# Patient Record
Sex: FEMALE | Race: WHITE | NOT HISPANIC OR LATINO
[De-identification: names, ages, dates, MRNs, and addresses within clinical notes are randomized per-mention and may not be internally consistent; named-entity substitution may affect disease eponyms.]

---

## 2023-07-12 ENCOUNTER — APPOINTMENT (OUTPATIENT)
Dept: HEART AND VASCULAR | Facility: CLINIC | Age: 53
End: 2023-07-12

## 2023-07-20 ENCOUNTER — APPOINTMENT (OUTPATIENT)
Dept: HEART AND VASCULAR | Facility: CLINIC | Age: 53
End: 2023-07-20
Payer: MEDICAID

## 2023-07-20 ENCOUNTER — NON-APPOINTMENT (OUTPATIENT)
Age: 53
End: 2023-07-20

## 2023-07-20 VITALS
HEIGHT: 64 IN | HEART RATE: 70 BPM | DIASTOLIC BLOOD PRESSURE: 90 MMHG | SYSTOLIC BLOOD PRESSURE: 140 MMHG | RESPIRATION RATE: 12 BRPM | WEIGHT: 146 LBS | BODY MASS INDEX: 24.92 KG/M2

## 2023-07-20 DIAGNOSIS — R01.1 CARDIAC MURMUR, UNSPECIFIED: ICD-10-CM

## 2023-07-20 DIAGNOSIS — Z82.49 FAMILY HISTORY OF ISCHEMIC HEART DISEASE AND OTHER DISEASES OF THE CIRCULATORY SYSTEM: ICD-10-CM

## 2023-07-20 DIAGNOSIS — Z78.9 OTHER SPECIFIED HEALTH STATUS: ICD-10-CM

## 2023-07-20 DIAGNOSIS — Z86.39 PERSONAL HISTORY OF OTHER ENDOCRINE, NUTRITIONAL AND METABOLIC DISEASE: ICD-10-CM

## 2023-07-20 PROCEDURE — 99204 OFFICE O/P NEW MOD 45 MIN: CPT | Mod: 25

## 2023-07-20 PROCEDURE — 93306 TTE W/DOPPLER COMPLETE: CPT

## 2023-07-20 PROCEDURE — 93000 ELECTROCARDIOGRAM COMPLETE: CPT

## 2023-07-20 RX ORDER — SIMVASTATIN 20 MG/1
20 TABLET, FILM COATED ORAL
Refills: 0 | Status: ACTIVE | COMMUNITY

## 2023-07-20 RX ORDER — DROSPIRENONE AND ETHINYL ESTRADIOL 0.02-3(28)
3-0.02 KIT ORAL
Refills: 0 | Status: ACTIVE | COMMUNITY

## 2023-07-20 NOTE — PHYSICAL EXAM

## 2023-07-20 NOTE — HISTORY OF PRESENT ILLNESS
[FreeTextEntry1] : 53-year-old female with a past medical history of hyperlipidemia and strong family history of early atherosclerotic heart disease comes in to establish care.  Overall, feels well denies chest pain shortness of breath PND orthopnea.

## 2023-07-20 NOTE — DISCUSSION/SUMMARY
[FreeTextEntry1] : 1.  Hypertension: Borderline; advised to keep a log of her blood pressure for the next 10 days and to return in 10 days with the machine and log for comparison to manual pressure reading.  Maintain a low-salt diet\par 2.  Hyperlipidemia: Patient to have fasting blood work done by PCP at her next visit\par 3.  Cardiac murmur: Echocardiogram [EKG obtained to assist in diagnosis and management of assessed problem(s)] : EKG obtained to assist in diagnosis and management of assessed problem(s)

## 2023-08-01 ENCOUNTER — APPOINTMENT (OUTPATIENT)
Dept: HEART AND VASCULAR | Facility: CLINIC | Age: 53
End: 2023-08-01
Payer: MEDICAID

## 2023-08-01 VITALS
HEART RATE: 90 BPM | SYSTOLIC BLOOD PRESSURE: 138 MMHG | WEIGHT: 145 LBS | HEIGHT: 64 IN | DIASTOLIC BLOOD PRESSURE: 90 MMHG | BODY MASS INDEX: 24.75 KG/M2 | RESPIRATION RATE: 12 BRPM

## 2023-08-01 DIAGNOSIS — I10 ESSENTIAL (PRIMARY) HYPERTENSION: ICD-10-CM

## 2023-08-01 PROCEDURE — 99213 OFFICE O/P EST LOW 20 MIN: CPT

## 2023-08-01 NOTE — HISTORY OF PRESENT ILLNESS
[FreeTextEntry1] : 53-year-old female comes in for hypertension follow-up.  Since last visit patient has been keeping a blood pressure log twice a day on average blood pressure between 118-130/80-90.  Overall feels well denies chest pain shortness of breath PND orthopnea.

## 2023-08-01 NOTE — DISCUSSION/SUMMARY
[FreeTextEntry1] : 1.  Hypertension: Patient's machine recorded pressure of 150/100 clearly higher than manual pressure reading.  At this time as she does not have a single elevated pressure on her home monitoring yet while here in the office compared to manual pressure her machine seems to be higher highly suggestive of anxiety rather than essential hypertension.  At this time we will hold off on starting medication and advised to continue to monitor her pressure as well as maintain a low-salt diet.

## 2023-08-24 ENCOUNTER — APPOINTMENT (OUTPATIENT)
Dept: OBGYN | Facility: CLINIC | Age: 53
End: 2023-08-24
Payer: MEDICAID

## 2023-08-24 VITALS
DIASTOLIC BLOOD PRESSURE: 84 MMHG | HEIGHT: 64 IN | SYSTOLIC BLOOD PRESSURE: 127 MMHG | WEIGHT: 147 LBS | BODY MASS INDEX: 25.1 KG/M2

## 2023-08-24 DIAGNOSIS — Z30.9 ENCOUNTER FOR CONTRACEPTIVE MANAGEMENT, UNSPECIFIED: ICD-10-CM

## 2023-08-24 DIAGNOSIS — Z01.419 ENCOUNTER FOR GYNECOLOGICAL EXAMINATION (GENERAL) (ROUTINE) W/OUT ABNORMAL FINDINGS: ICD-10-CM

## 2023-08-24 DIAGNOSIS — Z12.39 ENCOUNTER FOR OTHER SCREENING FOR MALIGNANT NEOPLASM OF BREAST: ICD-10-CM

## 2023-08-24 LAB
BILIRUB UR QL STRIP: NORMAL
GLUCOSE UR-MCNC: NORMAL
HCG UR QL: 1 EU/DL
HCG UR QL: NEGATIVE
HGB UR QL STRIP.AUTO: NORMAL
KETONES UR-MCNC: 15
LEUKOCYTE ESTERASE UR QL STRIP: NORMAL
NITRITE UR QL STRIP: NORMAL
PH UR STRIP: 6
PROT UR STRIP-MCNC: 30
SP GR UR STRIP: 1.02

## 2023-08-24 PROCEDURE — 81025 URINE PREGNANCY TEST: CPT

## 2023-08-24 PROCEDURE — 81003 URINALYSIS AUTO W/O SCOPE: CPT | Mod: QW

## 2023-08-24 PROCEDURE — 99386 PREV VISIT NEW AGE 40-64: CPT

## 2023-08-24 RX ORDER — DROSPIRENONE AND ETHINYL ESTRADIOL 0.02-3(28)
3-0.02 KIT ORAL DAILY
Qty: 1 | Refills: 12 | Status: ACTIVE | COMMUNITY
Start: 2023-08-24 | End: 1900-01-01

## 2023-08-25 LAB — HPV HIGH+LOW RISK DNA PNL CVX: NOT DETECTED

## 2023-08-28 LAB — CYTOLOGY CVX/VAG DOC THIN PREP: NORMAL

## 2023-08-30 NOTE — DISCUSSION/SUMMARY
[FreeTextEntry1] : 52 yo on Lynette a 20 mcg estradioil pill, pt currently on active pills, advised she come when she is at the end of the placebo week to test FSH  if the numbers indicate that she is solidly in the menopausal range she should stop  will continue for now BP today normal pap sent mammo ref nb/v 1 yr or PRN

## 2023-08-30 NOTE — HISTORY OF PRESENT ILLNESS
[FreeTextEntry1] : 54yo here for annual exam has been on a low dose OCP for many years and would like to stay on until she can be sure that she is truly menopausal pt has been followed by cardiology for elevated BP last note from him felt that she does not have essential hypertension and her occasional mild elevations seem to be due to anxiety he held her BP med at the moment and he is aware that she takes OCPs pt does not have any complaints

## 2023-09-08 ENCOUNTER — APPOINTMENT (OUTPATIENT)
Dept: OBGYN | Facility: CLINIC | Age: 53
End: 2023-09-08
Payer: MEDICAID

## 2023-09-08 DIAGNOSIS — Z00.00 ENCOUNTER FOR GENERAL ADULT MEDICAL EXAMINATION W/OUT ABNORMAL FINDINGS: ICD-10-CM

## 2023-09-08 PROCEDURE — 36415 COLL VENOUS BLD VENIPUNCTURE: CPT

## 2023-09-11 LAB
ESTRADIOL SERPL-MCNC: 12 PG/ML
FSH SERPL-MCNC: 1.8 IU/L
LH SERPL-ACNC: 0.7 IU/L

## 2024-03-11 ENCOUNTER — APPOINTMENT (OUTPATIENT)
Dept: OBGYN | Facility: CLINIC | Age: 54
End: 2024-03-11
Payer: MEDICAID

## 2024-03-11 PROCEDURE — 36415 COLL VENOUS BLD VENIPUNCTURE: CPT

## 2024-03-13 LAB
ESTRADIOL SERPL-MCNC: 9 PG/ML
FSH SERPL-MCNC: 59.8 IU/L

## 2024-03-25 ENCOUNTER — TRANSCRIPTION ENCOUNTER (OUTPATIENT)
Age: 54
End: 2024-03-25

## 2024-04-15 DIAGNOSIS — N76.0 ACUTE VAGINITIS: ICD-10-CM

## 2024-04-15 RX ORDER — CLOTRIMAZOLE AND BETAMETHASONE DIPROPIONATE 10; .5 MG/G; MG/G
1-0.05 CREAM TOPICAL
Qty: 1 | Refills: 0 | Status: ACTIVE | COMMUNITY
Start: 2024-04-15 | End: 1900-01-01

## 2024-04-15 RX ORDER — FLUCONAZOLE 100 MG/1
100 TABLET ORAL DAILY
Qty: 3 | Refills: 0 | Status: ACTIVE | COMMUNITY
Start: 2024-04-15 | End: 1900-01-01

## 2024-08-27 ENCOUNTER — APPOINTMENT (OUTPATIENT)
Dept: OBGYN | Facility: CLINIC | Age: 54
End: 2024-08-27
Payer: MEDICAID

## 2024-08-27 VITALS
SYSTOLIC BLOOD PRESSURE: 126 MMHG | BODY MASS INDEX: 26.26 KG/M2 | DIASTOLIC BLOOD PRESSURE: 77 MMHG | WEIGHT: 153 LBS | HEART RATE: 96 BPM

## 2024-08-27 DIAGNOSIS — N93.9 ABNORMAL UTERINE AND VAGINAL BLEEDING, UNSPECIFIED: ICD-10-CM

## 2024-08-27 DIAGNOSIS — Z01.419 ENCOUNTER FOR GYNECOLOGICAL EXAMINATION (GENERAL) (ROUTINE) W/OUT ABNORMAL FINDINGS: ICD-10-CM

## 2024-08-27 DIAGNOSIS — N83.202 UNSPECIFIED OVARIAN CYST, LEFT SIDE: ICD-10-CM

## 2024-08-27 LAB
BILIRUB UR QL STRIP: NORMAL
GLUCOSE UR-MCNC: NORMAL
HCG UR QL: 0.2 EU/DL
HCG UR QL: NEGATIVE
HGB UR QL STRIP.AUTO: NORMAL
KETONES UR-MCNC: 15
LEUKOCYTE ESTERASE UR QL STRIP: NORMAL
NITRITE UR QL STRIP: NORMAL
PH UR STRIP: 6
PROT UR STRIP-MCNC: NORMAL
SP GR UR STRIP: 1.02

## 2024-08-27 PROCEDURE — 99396 PREV VISIT EST AGE 40-64: CPT

## 2024-08-27 PROCEDURE — 81025 URINE PREGNANCY TEST: CPT

## 2024-08-27 PROCEDURE — 99459 PELVIC EXAMINATION: CPT

## 2024-08-27 PROCEDURE — 81003 URINALYSIS AUTO W/O SCOPE: CPT | Mod: QW

## 2024-08-27 PROCEDURE — 99213 OFFICE O/P EST LOW 20 MIN: CPT | Mod: 25

## 2024-08-27 PROCEDURE — 76830 TRANSVAGINAL US NON-OB: CPT

## 2024-08-27 NOTE — PROCEDURE
[Abnormal Uterine Bleeding] : abnormal uterine bleeding [Transvaginal Ultrasound] : transvaginal ultrasound [FreeTextEntry3] : uterus is small and anteverted, the endometrium is homogenous and measures 4.1 mm thick  there is no fluid in the endometrium,  adjacent to the endometrium there is a heterogenous spherical structure measuring  1.8 cm by 1.5 cm. c/w a small intramural myoma with degenerative changes.  the left ovary has within it a 3 cm diameter simple thin walled cyst.  there is no FF in the pelvis, the right ovary is small and unremarkable. [FreeTextEntry4] : small intramural myoma, left ovarian simple cyst

## 2024-08-27 NOTE — HISTORY OF PRESENT ILLNESS
[FreeTextEntry1] : 55 yo had stopped OCPs In Jan 20024 and had a last period then, then no further bleeding until early this month, had a brief but heavy period and then 15 days later had another period for 3 days of bleeding  no further bleeding for the past wk has mammo and breast sono scheduled for next wk on no meds currently

## 2024-08-27 NOTE — PHYSICAL EXAM
[Chaperone Present] : A chaperone was present in the examining room during all aspects of the physical examination [[id number]] : A chaperone was present during the pelvic exam. [Soft] : soft [Non-tender] : non-tender [Non-distended] : non-distended [No Mass] : no mass [Oriented x3] : oriented x3 [Examination Of The Breasts] : a normal appearance [No Masses] : no breast masses were palpable [Labia Majora] : normal [Labia Minora] : normal [Normal] : normal [Uterine Adnexae] : normal

## 2024-08-28 LAB
C TRACH RRNA SPEC QL NAA+PROBE: NOT DETECTED
N GONORRHOEA RRNA SPEC QL NAA+PROBE: NOT DETECTED
SOURCE AMPLIFICATION: NORMAL